# Patient Record
Sex: FEMALE | Race: OTHER | NOT HISPANIC OR LATINO
[De-identification: names, ages, dates, MRNs, and addresses within clinical notes are randomized per-mention and may not be internally consistent; named-entity substitution may affect disease eponyms.]

---

## 2021-05-17 PROBLEM — Z00.00 ENCOUNTER FOR PREVENTIVE HEALTH EXAMINATION: Status: ACTIVE | Noted: 2021-05-17

## 2021-05-18 ENCOUNTER — APPOINTMENT (OUTPATIENT)
Dept: SURGICAL ONCOLOGY | Facility: CLINIC | Age: 41
End: 2021-05-18
Payer: COMMERCIAL

## 2021-05-18 VITALS
TEMPERATURE: 98.1 F | HEIGHT: 71 IN | DIASTOLIC BLOOD PRESSURE: 70 MMHG | SYSTOLIC BLOOD PRESSURE: 110 MMHG | OXYGEN SATURATION: 97 % | BODY MASS INDEX: 22.4 KG/M2 | WEIGHT: 160 LBS | RESPIRATION RATE: 16 BRPM | HEART RATE: 81 BPM

## 2021-05-18 DIAGNOSIS — Z78.9 OTHER SPECIFIED HEALTH STATUS: ICD-10-CM

## 2021-05-18 DIAGNOSIS — Z80.8 FAMILY HISTORY OF MALIGNANT NEOPLASM OF OTHER ORGANS OR SYSTEMS: ICD-10-CM

## 2021-05-18 PROCEDURE — 99072 ADDL SUPL MATRL&STAF TM PHE: CPT

## 2021-05-18 PROCEDURE — 99205 OFFICE O/P NEW HI 60 MIN: CPT

## 2021-05-18 RX ORDER — DOXYLAMINE SUCCINATE 25 MG
25 TABLET ORAL
Refills: 0 | Status: ACTIVE | COMMUNITY

## 2021-05-18 NOTE — CONSULT LETTER
[Dear  ___] : Dear  [unfilled], [Courtesy Letter:] : I had the pleasure of seeing your patient, [unfilled], in my office today. [Please see my note below.] : Please see my note below. [Consult Closing:] : Thank you very much for allowing me to participate in the care of this patient.  If you have any questions, please do not hesitate to contact me. [Sincerely,] : Sincerely, [FreeTextEntry3] : Isaac Ramos MD, MPH, FACS, FSSO\par , [facility] General Surgical Oncology [facility]\par [facility] [place]\par Associate Professor of Surgery\par [employer] [facility]

## 2021-05-18 NOTE — HISTORY OF PRESENT ILLNESS
[de-identified] : Natividad Asif is a 41 year-old female here for an initial consultation, referred by Dr. Rhett Vincent. \par \par She saw Dr. Rhett Vincent from dermatology on 5/11/21 and underwent the following biopsies with the following pathology:\par 1) Left neck: intradermal melanocytic nevus\par 2) Back: malignant melanoma measuring approximately 1 mm in thickness, associated with a melanocytic nevus, no ulceration, no regression, mitotic rate < 1/mm^2 (T1b).\par \par No significant PMH/PSH. \par Never a smoker, social alcohol use. \par Family history includes father with BCC.

## 2021-05-18 NOTE — ASSESSMENT
[FreeTextEntry1] : 41 year-old female who saw Dr. Rhett Vincent from dermatology on 5/11/21 and underwent the following biopsies with the following pathology:\par 1) Left neck: intradermal melanocytic nevus\par 2) Back: malignant melanoma measuring approximately 1 mm in thickness, associated with a melanocytic nevus, no ulceration, no regression, mitotic rate < 1/mm^2 (T1b).\par \par PLAN:\par 1) Wide local excision of left upper back melanoma with sentinel lymph node biopsy.  We discussed the risks, benefits, and alternatives of the procedure with the patient, she expressed understanding and agree to proceed.  Risks include but are not limited to bleeding, infection, lymphedema (2-3%) risk.\par 2) Path review at [facility]

## 2021-05-18 NOTE — PHYSICAL EXAM
[Normal] : supple, no neck mass and thyroid not enlarged [Normal Neck Lymph Nodes] : normal neck lymph nodes  [Normal Supraclavicular Lymph Nodes] : normal supraclavicular lymph nodes [Normal Groin Lymph Nodes] : normal groin lymph nodes [Normal Axillary Lymph Nodes] : normal axillary lymph nodes [Normal] : oriented to person, place and time, with appropriate affect [de-identified] : healing left neck shave biopsy site;  Healing back shave biopsy site

## 2022-06-14 ENCOUNTER — APPOINTMENT (OUTPATIENT)
Dept: OTOLARYNGOLOGY | Facility: CLINIC | Age: 42
End: 2022-06-14

## 2022-06-14 VITALS
BODY MASS INDEX: 22.4 KG/M2 | HEIGHT: 71 IN | OXYGEN SATURATION: 98 % | WEIGHT: 160 LBS | SYSTOLIC BLOOD PRESSURE: 103 MMHG | DIASTOLIC BLOOD PRESSURE: 70 MMHG | HEART RATE: 86 BPM

## 2022-06-14 PROCEDURE — 31579 LARYNGOSCOPY TELESCOPIC: CPT

## 2022-06-14 PROCEDURE — 99204 OFFICE O/P NEW MOD 45 MIN: CPT | Mod: 25

## 2022-06-14 RX ORDER — FAMOTIDINE 40 MG/1
40 TABLET, FILM COATED ORAL
Qty: 90 | Refills: 1 | Status: ACTIVE | COMMUNITY
Start: 2022-06-14 | End: 1900-01-01

## 2022-06-14 NOTE — HISTORY OF PRESENT ILLNESS
[de-identified] : 42 year old female with history of Malignant Melanoma (Back).\par Presents for evaluation of Dysphonia. \par Patient states that she has always had a raspy voice, but reports voice changes within the past year.\par Reports occasional loss of voice, accompanied by throat discomfort. No pain with speaking but sore when gets tired.\par Denies history of acid reflux, dysphagia, odynophagia and dyspnea. \par No recent throat infections. No history of smoking.\par No ALVAREZ. Denies globus sensation.\par Had back surgery for melanoma with ETT, LN dissection\par

## 2022-08-09 ENCOUNTER — OUTPATIENT (OUTPATIENT)
Dept: OUTPATIENT SERVICES | Facility: HOSPITAL | Age: 42
LOS: 1 days | End: 2022-08-09
Payer: COMMERCIAL

## 2022-08-09 VITALS
HEART RATE: 80 BPM | HEIGHT: 69.5 IN | RESPIRATION RATE: 18 BRPM | WEIGHT: 149.91 LBS | TEMPERATURE: 99 F | SYSTOLIC BLOOD PRESSURE: 107 MMHG | OXYGEN SATURATION: 100 % | DIASTOLIC BLOOD PRESSURE: 71 MMHG

## 2022-08-09 DIAGNOSIS — Z01.818 ENCOUNTER FOR OTHER PREPROCEDURAL EXAMINATION: ICD-10-CM

## 2022-08-09 DIAGNOSIS — K08.409 PARTIAL LOSS OF TEETH, UNSPECIFIED CAUSE, UNSPECIFIED CLASS: Chronic | ICD-10-CM

## 2022-08-09 DIAGNOSIS — Z98.890 OTHER SPECIFIED POSTPROCEDURAL STATES: Chronic | ICD-10-CM

## 2022-08-09 DIAGNOSIS — J38.1 POLYP OF VOCAL CORD AND LARYNX: ICD-10-CM

## 2022-08-09 DIAGNOSIS — R49.0 DYSPHONIA: ICD-10-CM

## 2022-08-09 LAB
HCG SERPL-ACNC: <1 MIU/ML — SIGNIFICANT CHANGE UP
HCT VFR BLD CALC: 37 % — SIGNIFICANT CHANGE UP (ref 34.5–45)
HGB BLD-MCNC: 12.9 G/DL — SIGNIFICANT CHANGE UP (ref 11.5–15.5)
MCHC RBC-ENTMCNC: 31.3 PG — SIGNIFICANT CHANGE UP (ref 27–34)
MCHC RBC-ENTMCNC: 34.9 GM/DL — SIGNIFICANT CHANGE UP (ref 32–36)
MCV RBC AUTO: 89.8 FL — SIGNIFICANT CHANGE UP (ref 80–100)
NRBC # BLD: 0 /100 WBCS — SIGNIFICANT CHANGE UP (ref 0–0)
PLATELET # BLD AUTO: 196 K/UL — SIGNIFICANT CHANGE UP (ref 150–400)
RBC # BLD: 4.12 M/UL — SIGNIFICANT CHANGE UP (ref 3.8–5.2)
RBC # FLD: 13.2 % — SIGNIFICANT CHANGE UP (ref 10.3–14.5)
WBC # BLD: 5.44 K/UL — SIGNIFICANT CHANGE UP (ref 3.8–10.5)
WBC # FLD AUTO: 5.44 K/UL — SIGNIFICANT CHANGE UP (ref 3.8–10.5)

## 2022-08-09 PROCEDURE — 85027 COMPLETE CBC AUTOMATED: CPT

## 2022-08-09 PROCEDURE — G0463: CPT

## 2022-08-09 PROCEDURE — 36415 COLL VENOUS BLD VENIPUNCTURE: CPT

## 2022-08-09 PROCEDURE — 84702 CHORIONIC GONADOTROPIN TEST: CPT

## 2022-08-09 NOTE — H&P PST ADULT - NEGATIVE ENMT SYMPTOMS
no hearing difficulty/no ear pain/no nasal congestion/no nasal discharge/no nasal obstruction/no post-nasal discharge/no dysphagia

## 2022-08-09 NOTE — H&P PST ADULT - HISTORY OF PRESENT ILLNESS
43 y/o female with PMH of insomnia and GERD presents for PST/  C?O hoarse voice resulting in eval from ENT and was dx with polyp of vocal cord and larynx recommended for surgical removal.  Feeling well at PST today with no cough, fever, or recent illness.  Scheduled for microdirect laryngoscopy w/excision polyp, injection larygoplasty with Dr Silver on 08/12/2022.  COVID-19 test not required per policy  pos test  43 y/o female with PMH of insomnia and GERD presents for PST.  C/O hoarse voice resulting in eval from ENT and was dx with polyp of vocal cord and larynx recommended for surgical removal.  Feeling well at PST today with no cough, fever, or recent illness.  Scheduled for microdirect laryngoscopy w/excision polyp, injection laryngoplasty with Dr Silver on 08/12/2022.  COVID-19 test not required per policy  pos test 7/13/2022- results on chart

## 2022-08-09 NOTE — H&P PST ADULT - FALL HARM RISK - UNIVERSAL INTERVENTIONS
admission Bed in lowest position, wheels locked, appropriate side rails in place/Call bell, personal items and telephone in reach/Instruct patient to call for assistance before getting out of bed or chair/Non-slip footwear when patient is out of bed/[place] to call system/Physically safe environment - no spills, clutter or unnecessary equipment/Purposeful Proactive Rounding/Room/bathroom lighting operational, light cord in reach

## 2022-08-09 NOTE — H&P PST ADULT - NSICDXPASTMEDICALHX_GEN_ALL_CORE_FT
PAST MEDICAL HISTORY:  GERD (gastroesophageal reflux disease)     History of 2019 novel coronavirus disease (COVID-19) home test 7/11/2022, pcr 7/12/2022 -   s/p paxlovid    Insomnia     Melanoma     Polyp of vocal cord and larynx

## 2022-08-09 NOTE — H&P PST ADULT - PROBLEM SELECTOR PLAN 1
Scheduled for microdirect laryngoscopy e/excision polyp, injection laryngoplasty with Dr Silver on 08/12/2022.  COVID-19 testing not required -  Pos 7/13/2022. results on chart.  Pre op instructions given and patient verbalized understanding.  CBC, hcg pending.  NPO after midnight night before procedure.  To stop all ASA, NSAIDs, vitamins and supplements 1 week prior to procedure.

## 2022-08-10 DIAGNOSIS — Z01.818 ENCOUNTER FOR OTHER PREPROCEDURAL EXAMINATION: ICD-10-CM

## 2022-08-11 ENCOUNTER — TRANSCRIPTION ENCOUNTER (OUTPATIENT)
Age: 42
End: 2022-08-11

## 2022-08-12 ENCOUNTER — APPOINTMENT (OUTPATIENT)
Dept: OTOLARYNGOLOGY | Facility: HOSPITAL | Age: 42
End: 2022-08-12

## 2022-08-12 ENCOUNTER — TRANSCRIPTION ENCOUNTER (OUTPATIENT)
Age: 42
End: 2022-08-12

## 2022-08-12 ENCOUNTER — RESULT REVIEW (OUTPATIENT)
Age: 42
End: 2022-08-12

## 2022-08-12 ENCOUNTER — OUTPATIENT (OUTPATIENT)
Dept: INPATIENT UNIT | Facility: HOSPITAL | Age: 42
LOS: 1 days | End: 2022-08-12
Payer: COMMERCIAL

## 2022-08-12 VITALS
WEIGHT: 149.91 LBS | DIASTOLIC BLOOD PRESSURE: 70 MMHG | HEART RATE: 76 BPM | RESPIRATION RATE: 14 BRPM | SYSTOLIC BLOOD PRESSURE: 104 MMHG | TEMPERATURE: 98 F | HEIGHT: 69.5 IN | OXYGEN SATURATION: 100 %

## 2022-08-12 VITALS
DIASTOLIC BLOOD PRESSURE: 74 MMHG | TEMPERATURE: 98 F | OXYGEN SATURATION: 98 % | RESPIRATION RATE: 16 BRPM | HEART RATE: 71 BPM | SYSTOLIC BLOOD PRESSURE: 114 MMHG

## 2022-08-12 DIAGNOSIS — R49.0 DYSPHONIA: ICD-10-CM

## 2022-08-12 DIAGNOSIS — J38.1 POLYP OF VOCAL CORD AND LARYNX: ICD-10-CM

## 2022-08-12 DIAGNOSIS — K08.409 PARTIAL LOSS OF TEETH, UNSPECIFIED CAUSE, UNSPECIFIED CLASS: Chronic | ICD-10-CM

## 2022-08-12 DIAGNOSIS — Z98.890 OTHER SPECIFIED POSTPROCEDURAL STATES: Chronic | ICD-10-CM

## 2022-08-12 PROCEDURE — 31545 REMOVE VC LESION W/SCOPE: CPT | Mod: 50

## 2022-08-12 PROCEDURE — 88305 TISSUE EXAM BY PATHOLOGIST: CPT | Mod: 26

## 2022-08-12 PROCEDURE — 31571 LARYNGOSCOP W/VC INJ + SCOPE: CPT | Mod: 59

## 2022-08-12 PROCEDURE — C1889: CPT

## 2022-08-12 PROCEDURE — 88305 TISSUE EXAM BY PATHOLOGIST: CPT

## 2022-08-12 PROCEDURE — 31574 LARGSC W/NJX AUGMENTATION: CPT

## 2022-08-12 PROCEDURE — C9399: CPT

## 2022-08-12 PROCEDURE — 31541 LARYNSCOP W/TUMR EXC + SCOPE: CPT

## 2022-08-12 DEVICE — GEL PROLARYN 1 CC SYR W TRANS ORAL NDL
Type: IMPLANTABLE DEVICE | Status: NON-FUNCTIONAL
Removed: 2022-08-12

## 2022-08-12 DEVICE — GEL PROLARYN PLUS 1 CC SYR W TRANS ORAL NDL
Type: IMPLANTABLE DEVICE | Status: NON-FUNCTIONAL
Removed: 2022-08-12

## 2022-08-12 DEVICE — DRSG JUVEDERM ULTRA PLUS 1.0CC
Type: IMPLANTABLE DEVICE | Status: NON-FUNCTIONAL
Removed: 2022-08-12

## 2022-08-12 DEVICE — GEL JUVEDERM VOLUMA XC
Type: IMPLANTABLE DEVICE | Status: NON-FUNCTIONAL
Removed: 2022-08-12

## 2022-08-12 RX ORDER — FAMOTIDINE 10 MG/ML
1 INJECTION INTRAVENOUS
Qty: 0 | Refills: 0 | DISCHARGE

## 2022-08-12 RX ORDER — SENNA PLUS 8.6 MG/1
1 TABLET ORAL
Qty: 0 | Refills: 0 | DISCHARGE

## 2022-08-12 RX ORDER — ACETAMINOPHEN 500 MG
650 TABLET ORAL EVERY 6 HOURS
Refills: 0 | Status: DISCONTINUED | OUTPATIENT
Start: 2022-08-12 | End: 2022-08-26

## 2022-08-12 RX ORDER — DOXYLAMINE SUCCINATE 25 MG
1 TABLET ORAL
Qty: 0 | Refills: 0 | DISCHARGE

## 2022-08-12 RX ORDER — SODIUM CHLORIDE 9 MG/ML
1000 INJECTION, SOLUTION INTRAVENOUS
Refills: 0 | Status: DISCONTINUED | OUTPATIENT
Start: 2022-08-12 | End: 2022-08-12

## 2022-08-12 RX ORDER — ONDANSETRON 8 MG/1
4 TABLET, FILM COATED ORAL ONCE
Refills: 0 | Status: DISCONTINUED | OUTPATIENT
Start: 2022-08-12 | End: 2022-08-12

## 2022-08-12 RX ORDER — ACETAMINOPHEN 500 MG
1000 TABLET ORAL
Qty: 0 | Refills: 0 | DISCHARGE

## 2022-08-12 RX ORDER — IBUPROFEN 200 MG
2 TABLET ORAL
Qty: 0 | Refills: 0 | DISCHARGE

## 2022-08-12 RX ORDER — HYDROMORPHONE HYDROCHLORIDE 2 MG/ML
0.5 INJECTION INTRAMUSCULAR; INTRAVENOUS; SUBCUTANEOUS
Refills: 0 | Status: DISCONTINUED | OUTPATIENT
Start: 2022-08-12 | End: 2022-08-12

## 2022-08-12 RX ORDER — ACETAMINOPHEN 500 MG
2 TABLET ORAL
Qty: 0 | Refills: 0 | DISCHARGE

## 2022-08-12 RX ADMIN — SODIUM CHLORIDE 50 MILLILITER(S): 9 INJECTION, SOLUTION INTRAVENOUS at 08:53

## 2022-08-12 NOTE — ASU DISCHARGE PLAN (ADULT/PEDIATRIC) - CARE PROVIDER_API CALL
Jose L Silver  OTOLARYNGOLOGY  [id number] [address] [place] [address]  Phone: [phone]  Fax: [phone]  Follow Up Time: Routine

## 2022-08-12 NOTE — ASU PATIENT PROFILE, ADULT - NSICDXPASTMEDICALHX_GEN_ALL_CORE_FT
PAST MEDICAL HISTORY:  GERD (gastroesophageal reflux disease)     History of 2019 novel coronavirus disease (COVID-19) home test 7/11/2022, pcr 7/12/2022 -   s/p paxlovid    Insomnia     Melanoma     Polyp of vocal cord and larynx      PAST MEDICAL HISTORY:  History of 2019 novel coronavirus disease (COVID-19) home test 7/11/2022, pcr 7/12/2022 -   s/p paxlovid    Insomnia     Melanoma     Polyp of vocal cord and larynx

## 2022-08-12 NOTE — ASU DISCHARGE PLAN (ADULT/PEDIATRIC) - B. DRINK ALCOHOL, BEER, OR WINE
Rest, drink plenty of fluids.  Clear liquid fluids for 24 hours and advance as tolerated to bland diet till symptoms improve.  Take your meds as prescribed.  Warm salt water gargles several times a day for comfort.  You may take over-the-counter acetaminophen and Motrin as needed for aches pains and fever.  Call your family doctor today to follow-up on Monday or Tuesday so they may reevaluate you and for further evaluation and treatment.  Return to the emergency department for any acutely developing respiratory distress, any persistent vomiting, any airway difficulty or any new or worse concerns.   Statement Selected

## 2022-08-12 NOTE — ASU DISCHARGE PLAN (ADULT/PEDIATRIC) - ASU DC SPECIAL INSTRUCTIONSFT
elevate head to 30 degrees  when sleeping  voice rest for 5 days   soft diet   Take tylenol or motrin for pain   Take famotidine twice a day as directed   follow up with Dr Silver as scheduled

## 2022-08-12 NOTE — BRIEF OPERATIVE NOTE - NSICDXBRIEFPROCEDURE_GEN_ALL_CORE_FT
PROCEDURES:  Microlaryngoscopy, direct, with vocal cord polypectomy 12-Aug-2022 11:48:41  Jose L Silver  Microlaryngoscopy with injection 12-Aug-2022 11:48:47  Jose L Silver

## 2022-08-12 NOTE — ASU DISCHARGE PLAN (ADULT/PEDIATRIC) - NS MD DC FALL RISK RISK
For information on Fall & Injury Prevention, visit: [email or web address] OR  [email or web address] OR  https://www.cdc.gov/steadi/patient.html

## 2022-08-12 NOTE — ASU PATIENT PROFILE, ADULT - NS PRO TALK SOMEONE YN
[address] [place] [address] 25. [address] [id number]  Phone: [phone]  Fax: [phone]    [facility]        June 27, 2022     Patient: Oriana Saldaña   YOB: 1971   Date of Visit: 6/27/2022       To Whom It May Concern:    Ji Price had surgery on 5/27/2022. He will have the following restrictions for 3 months after surgery:  No lifting over 10 lbs for first month and 20-25 lbs for the second and third month, No repetitive lifting, bending, pushing, pulling or twisting. He was re-evaluated at his 1 month appointment today (6/27/22) and it is my medical opinion that Ji Price should not drive or fly for a full 3 months post operatively - until 8/22/22. He will start physical therapy 8 weeks post op. If you have any questions or concerns, please don't hesitate to call.     Sincerely,        Rachid Duval PA-C no

## 2022-08-12 NOTE — ASU PATIENT PROFILE, ADULT - FALL HARM RISK - UNIVERSAL INTERVENTIONS
Bed in lowest position, wheels locked, appropriate side rails in place/Call bell, personal items and telephone in reach/Instruct patient to call for assistance before getting out of bed or chair/Non-slip footwear when patient is out of bed/[place] to call system/Physically safe environment - no spills, clutter or unnecessary equipment/Purposeful Proactive Rounding/Room/bathroom lighting operational, light cord in reach

## 2022-09-07 ENCOUNTER — APPOINTMENT (OUTPATIENT)
Dept: OTOLARYNGOLOGY | Facility: CLINIC | Age: 42
End: 2022-09-07

## 2022-09-07 DIAGNOSIS — J38.1 POLYP OF VOCAL CORD AND LARYNX: ICD-10-CM

## 2022-09-07 DIAGNOSIS — C43.9 MALIGNANT MELANOMA OF SKIN, UNSPECIFIED: ICD-10-CM

## 2022-09-07 PROBLEM — Z86.16 PERSONAL HISTORY OF COVID-19: Chronic | Status: ACTIVE | Noted: 2022-08-09

## 2022-09-07 PROBLEM — G47.00 INSOMNIA, UNSPECIFIED: Chronic | Status: ACTIVE | Noted: 2022-08-09

## 2022-09-07 PROCEDURE — 31579 LARYNGOSCOPY TELESCOPIC: CPT

## 2022-09-07 PROCEDURE — 99213 OFFICE O/P EST LOW 20 MIN: CPT | Mod: 25

## 2022-09-07 NOTE — HISTORY OF PRESENT ILLNESS
[de-identified] : 42 year old female with history of Malignant Melanoma (Back).S/p microDL with excision bilateral vocal fold masses, bilateral injection laryngoplasty 8/12/22\par Reports voice has been really good. Denies SOB \par Denies hemoptysis \par Currently taking famotidine twice a day. \par Denies dysphagia, odynophagia  or choking.  \par No recent throat infections/fever\par Feeling well overall \par

## 2022-09-07 NOTE — ADDENDUM
[FreeTextEntry1] : Documented by Greg Alcantara acting as scribe for Dr. Silver on 09/07/2022.\par \par All Medical record entries made by the Scribe were at my, Dr. Silver's, direction and personally dictated by me on 09/07/2022 . I have reviewed the chart and agree that the record accurately reflects my personal performance of the history, physical exam, assessment and plan. I have also personally directed, reviewed, and agreed with the discharge instructions.\par

## 2022-09-07 NOTE — CONSULT LETTER
[Dear  ___] : Dear  [unfilled], [Consult Letter:] : I had the pleasure of evaluating your patient, [unfilled]. [Please see my note below.] : Please see my note below. [Consult Closing:] : Thank you very much for allowing me to participate in the care of this patient.  If you have any questions, please do not hesitate to contact me. [Sincerely,] : Sincerely, [FreeTextEntry2] : Dear Dr. Dominic Armenta [FreeTextEntry3] :  Jose L Silver MD, PhD \par Chief, Division of Laryngology \par Department of Otolaryngology \par [facility] \par Pediatric Otolaryngology, [facility] \par  of Otolaryngology \par [facility] [employer] [facility]

## 2022-10-03 ENCOUNTER — APPOINTMENT (OUTPATIENT)
Dept: OTOLARYNGOLOGY | Facility: HOSPITAL | Age: 42
End: 2022-10-03

## 2022-12-07 NOTE — H&P PST ADULT - BP NONINVASIVE DIASTOLIC (MM HG)
Two sets of instructions on oxybutynin (DITROPAN) 5 MG tablet mediation. Please clarify.     Nando GUZMAN RN 12/7/2022 at 1:50 PM     72

## 2023-01-04 ENCOUNTER — APPOINTMENT (OUTPATIENT)
Dept: OTOLARYNGOLOGY | Facility: CLINIC | Age: 43
End: 2023-01-04

## 2023-11-06 ENCOUNTER — APPOINTMENT (OUTPATIENT)
Dept: OBGYN | Facility: CLINIC | Age: 43
End: 2023-11-06
Payer: COMMERCIAL

## 2023-11-06 ENCOUNTER — RESULT REVIEW (OUTPATIENT)
Age: 43
End: 2023-11-06

## 2023-11-06 PROCEDURE — 99396 PREV VISIT EST AGE 40-64: CPT

## 2023-11-22 ENCOUNTER — APPOINTMENT (OUTPATIENT)
Dept: OTOLARYNGOLOGY | Facility: CLINIC | Age: 43
End: 2023-11-22

## 2024-12-10 ENCOUNTER — APPOINTMENT (OUTPATIENT)
Dept: OBGYN | Facility: CLINIC | Age: 44
End: 2024-12-10
Payer: COMMERCIAL

## 2024-12-10 PROCEDURE — 99459 PELVIC EXAMINATION: CPT

## 2024-12-10 PROCEDURE — 96127 BRIEF EMOTIONAL/BEHAV ASSMT: CPT

## 2024-12-10 PROCEDURE — 99386 PREV VISIT NEW AGE 40-64: CPT

## 2025-04-28 ENCOUNTER — APPOINTMENT (OUTPATIENT)
Dept: ULTRASOUND IMAGING | Facility: CLINIC | Age: 45
End: 2025-04-28
Payer: COMMERCIAL

## 2025-04-28 PROCEDURE — 76700 US EXAM ABDOM COMPLETE: CPT

## 2025-04-28 PROCEDURE — 76856 US EXAM PELVIC COMPLETE: CPT

## (undated) DEVICE — CONTAINER SPECIMEN 100ML

## (undated) DEVICE — CATH FLEX SUCTION 5FR 60CM

## (undated) DEVICE — CATH SUCTION 4FRX60CM

## (undated) DEVICE — PACK BASIC

## (undated) DEVICE — SOL ANTI FOG

## (undated) DEVICE — TUBING SUCTION NONCONDUCTIVE 6MM X 12FT

## (undated) DEVICE — POSITIONER PATIENT SAFETY STRAP 3X60"

## (undated) DEVICE — CATH SUCTION 6FRX60CM

## (undated) DEVICE — DRAPE 3/4 SHEET 52X76"

## (undated) DEVICE — BOTOX COSMETIC 100 UNIT(DO NOT AUTOSOURCE)

## (undated) DEVICE — DVC ATOMIZER LARYNGO TRACH W 3ML SYR

## (undated) DEVICE — GLV 7.5 PROTEXIS PI MICRO

## (undated) DEVICE — DRAPE SPLIT SHEETS 77X120"

## (undated) DEVICE — ELCTR BOVIE HANDPIECE PENCIL

## (undated) DEVICE — ATOMIZER LARYNG-TRACH W/O 3ML SYR

## (undated) DEVICE — LABEL MED BLANK W PEN

## (undated) DEVICE — GOWN LG

## (undated) DEVICE — DRSG TELFA 3 X 8

## (undated) DEVICE — ELCTR GROUNDING PAD ADULT COVIDIEN

## (undated) DEVICE — SPONGE PATTY X-RAY 0.5X3"

## (undated) DEVICE — SPONGE RAYTEC 4X4 16PLY

## (undated) DEVICE — NDL SAFETY BUTTERFLY LL 25G X 3/4

## (undated) DEVICE — DRAPE TOWEL BLUE 17" X 24"

## (undated) DEVICE — BLANKET WARMER LOWER ADULT

## (undated) DEVICE — SYR LUER LOK 3CC

## (undated) DEVICE — WRAP COMPRESSION CALF MED